# Patient Record
Sex: MALE | Race: WHITE | NOT HISPANIC OR LATINO | URBAN - METROPOLITAN AREA
[De-identification: names, ages, dates, MRNs, and addresses within clinical notes are randomized per-mention and may not be internally consistent; named-entity substitution may affect disease eponyms.]

---

## 2017-08-21 ENCOUNTER — EMERGENCY (EMERGENCY)
Facility: HOSPITAL | Age: 32
LOS: 1 days | Discharge: HOME | End: 2017-08-21
Attending: EMERGENCY MEDICINE | Admitting: EMERGENCY MEDICINE
Payer: COMMERCIAL

## 2017-08-21 VITALS
RESPIRATION RATE: 17 BRPM | HEART RATE: 63 BPM | OXYGEN SATURATION: 97 % | TEMPERATURE: 98 F | SYSTOLIC BLOOD PRESSURE: 135 MMHG | DIASTOLIC BLOOD PRESSURE: 82 MMHG

## 2017-08-21 DIAGNOSIS — R07.89 OTHER CHEST PAIN: ICD-10-CM

## 2017-08-21 DIAGNOSIS — R07.81 PLEURODYNIA: ICD-10-CM

## 2017-08-21 LAB
CK MB BLD-MCNC: 0.59 % — SIGNIFICANT CHANGE UP
CK MB CFR SERPL CALC: 1.4 NG/ML — SIGNIFICANT CHANGE UP (ref 0.5–3.6)
D DIMER BLD IA.RAPID-MCNC: <150 NG/ML DDU — SIGNIFICANT CHANGE UP
TROPONIN I SERPL-MCNC: <0.017 NG/ML — LOW (ref 0.02–0.06)
TSH SERPL-MCNC: 2.37 UIU/ML — SIGNIFICANT CHANGE UP (ref 0.36–3.74)

## 2017-08-21 PROCEDURE — 93010 ELECTROCARDIOGRAM REPORT: CPT | Mod: 59

## 2017-08-21 PROCEDURE — 71020: CPT | Mod: 26

## 2017-08-21 PROCEDURE — 99284 EMERGENCY DEPT VISIT MOD MDM: CPT | Mod: 25

## 2017-08-21 RX ORDER — KETOROLAC TROMETHAMINE 30 MG/ML
30 SYRINGE (ML) INJECTION ONCE
Qty: 0 | Refills: 0 | Status: DISCONTINUED | OUTPATIENT
Start: 2017-08-21 | End: 2017-08-21

## 2017-08-21 RX ADMIN — Medication 30 MILLIGRAM(S): at 16:24

## 2017-08-21 NOTE — ED PROVIDER NOTE - OBJECTIVE STATEMENT
33 y/o M    no pmhx    Presents with c/o L lower rib pain x 8 days. States he has been boxing- had especially "rough" sparring session last sunday and has been having pain to L anterior low ribs since then. Endorsing pain with  twisting, palpation and deep inhalation. Does not remember specific injury or inciting event that triggered pain. Did not have pain until following morning. Denies shortness of breath, Dyspnea on exertion, palpitations, near syncope or lightheadedness. non-smoker. Denies skin changes/bruising. Denies recent travel

## 2017-08-21 NOTE — ED PROVIDER NOTE - PHYSICAL EXAMINATION
mild L lower/anterior rib tenderness to 8th/9th ribs  no skin changes  no bony point tenderness or step off, no crepitus  lungs sounds CTAB

## 2017-08-21 NOTE — ED ADULT TRIAGE NOTE - CHIEF COMPLAINT QUOTE
c/o left rib pain. pt was boxing a week ago and thought injury was muscular but pain is worsening every day. pain worsens on inspirtation and with movement. denies medical history

## 2017-08-21 NOTE — ED PROVIDER NOTE - MEDICAL DECISION MAKING DETAILS
+chest wall pain- reproducible on palpation. Likely musculoskeletal injury but will r/o PE vs ACS vs rib fracture or subsequent pneumothorax. Ordered EKG, CXR, ddimer, tropx1 (given duration x 1 week of constant pain), basic labs. If results are normal will DC with reassurance. Will give toradol for analgesia

## 2022-08-05 NOTE — ED ADULT NURSE NOTE - FALLEN IN THE PAST
"RN Review of Medical History / Physical Health Screen  Outpatient Mental Health Programs - [facility]    PATIENT'S NAME: Rafaela Mckeon  MRN:   [id number]  :   2002  ACCT. NUMBER: [id number]  CURRENT AGE:  19 year old    DATE OF DIAGNOSTIC ASSESSMENT: 22    DATE OF ADMISSION: 22     Please see Diagnostic Assessment for additional Medical History.     General Health:   Have you had any exposure to any communicable disease in the past 2-3 weeks? no     Are you aware of safe sex practices? yes   Do you have a history of seizures?     If so, do you have a seizure plan? Known triggers?     Notify patient that we will call 911 (if virtual) or a code (if in-person), if we were to witness seizure during group. yes - reaction to a medication, also hx of ECT; no provider concerns at this time    no  no    yes     Nutrition:    Are you on a special diet? If yes, please explain:  No - vegetarian   Do you have any concerns regarding your nutritional status? If yes, please explain:  no   Have you had any appetite changes in the last 3 months?  No - but consistently increased appetite r/t meds     Have you had any weight loss or weight gain in the last 3 months?  Yes, how much? Lost 22 pounds; r/t liver failure (has hepatologist - too sick to eat for a month)     Do you have a history of an eating disorder? Yes - Sx come and go, not super strong, intrusive thoughts but easier now to ignore them - discussions around healthy weight is triggering   Do you have a history of being in an eating disorder program? no - individual therapy         Height/Weight Review:  Patient reported height:  5'4\"      Patient reports weight:  Date last checked:  ~123 pounds   a week   Any referrals/needs identified?  none          Patient height and weight recorded by RN in epic flowsheet: No; Unable to measure  Programmatic Care currently provided via telehealth. All pt weights and " heights will be collected through patient self-report and recorded in physical health screening progress note upon admission to the program.      BMI Review:  Was the patient informed of BMI? no      Findings See above         Fall Risk:   Have you had any falls in the past 3 months? no     Do you currently use any assistive devices for mobility?   no      Does the patient have medication concerns? no   If yes, RN to triage if patient will address concerns with their community provider or with our program psychiatrist.     Does the patient have any acute or chronic pain concerns that might impact participation in the program? no       Additional Comments/Assessment: Pt denies seizures (current/historical), dizziness, mobility concerns. No fall risk assessed; No safety concerns r/t falls.      Per completion of the Medical History / Physical Health Screen, is there a recommendation to see / follow up with a primary care physician/clinic or dentist?    Yes, Recommendations:   other: nutrition, but is working with provider      Deanna Avila RN  8/5/2022             no